# Patient Record
Sex: FEMALE | Race: WHITE | NOT HISPANIC OR LATINO | Employment: OTHER | RURAL
[De-identification: names, ages, dates, MRNs, and addresses within clinical notes are randomized per-mention and may not be internally consistent; named-entity substitution may affect disease eponyms.]

---

## 2021-04-07 ENCOUNTER — HISTORICAL (OUTPATIENT)
Dept: ADMINISTRATIVE | Facility: HOSPITAL | Age: 66
End: 2021-04-07

## 2022-04-29 DIAGNOSIS — M17.11 OSTEOARTHRITIS OF RIGHT KNEE: ICD-10-CM

## 2022-04-29 DIAGNOSIS — S83.207A UNSPECIFIED TEAR OF UNSPECIFIED MENISCUS, CURRENT INJURY, LEFT KNEE, INITIAL ENCOUNTER: Primary | ICD-10-CM

## 2022-05-11 ENCOUNTER — CLINICAL SUPPORT (OUTPATIENT)
Dept: REHABILITATION | Facility: HOSPITAL | Age: 67
End: 2022-05-11
Payer: COMMERCIAL

## 2022-05-11 DIAGNOSIS — M25.661 DECREASED RANGE OF MOTION OF BOTH KNEES: ICD-10-CM

## 2022-05-11 DIAGNOSIS — M17.11 OSTEOARTHRITIS OF RIGHT KNEE: ICD-10-CM

## 2022-05-11 DIAGNOSIS — M25.562 LEFT ANTERIOR KNEE PAIN: ICD-10-CM

## 2022-05-11 DIAGNOSIS — M17.11 PRIMARY OSTEOARTHRITIS OF RIGHT KNEE: ICD-10-CM

## 2022-05-11 DIAGNOSIS — M25.662 DECREASED RANGE OF MOTION OF BOTH KNEES: ICD-10-CM

## 2022-05-11 DIAGNOSIS — S83.207A UNSPECIFIED TEAR OF UNSPECIFIED MENISCUS, CURRENT INJURY, LEFT KNEE, INITIAL ENCOUNTER: ICD-10-CM

## 2022-05-11 PROCEDURE — 97162 PT EVAL MOD COMPLEX 30 MIN: CPT

## 2022-05-11 NOTE — PROGRESS NOTES
"  Physical Therapy Initial Evaluation    Name: Cecilia Botello  Clinic Number: [id number]    Therapy Diagnosis:   Encounter Diagnoses   Name Primary?    Osteoarthritis of right knee     Unspecified tear of unspecified meniscus, current injury, left knee, initial encounter     Left anterior knee pain     Decreased range of motion of both knees      Physician: Wilfred Alas MD    Physician Orders: PT Eval and Treat   Medical Diagnosis from Referral: Osteoarthritis of right knee [M17.11], Unspecified tear of unspecified meniscus, current injury, left knee, initial encounter [S83.207A]    Evaluation Date: 5/11/2022  Authorization Period Expiration: 4/29/2023  Plan of Care Expiration: 08/03/2022  Visit # / Visits authorized: 1/ 1    Time In: 10:15 AM  Time Out: 10:57 AM  Total Billable Time: 42 minutes    Precautions: Standard    Subjective   Date of onset: 5 months ago   History of current condition - Kaity reports: Patient reports that she had knee pain about 5 knee ago; she states she got the xray 2 month ago which showed "bone on bone". Patient said MD says she may have a possible meniscus tear. Patient states she has not had a fall; she had a storke about a year ago. Patient states the pain feels radiates from her knee to her feet and has difficulty sleeping. Patient has difficulty walking.      Medical History:   No past medical history on file.    Surgical History:   Cecilia Botello  has no past surgical history on file.    Medications:   Cecilia currently has no medications in their medication list.    Allergies:   Review of patient's allergies indicates:  Not on File     Imaging, bone scan films: X-ray 2 months ago    Prior Therapy: None  Social History:  lives alone  Occupation: Disabled  Prior Level of Function: Independent  Current Level of Function: 50% of prior level of function    Pain:  Current 8/10, worst 8/10, best 5/10   Location: left knee   Description: Deep and Shooting  Aggravating Factors: " Walking  Easing Factors: rest    Pts goals: Decreased pain to perform more walking at home    Objective       Sensation:  intact to light touch    ROM   Right (degrees) Left (degrees)   Knee Flexion 125 110   Knee Extension -2 -5         Strength   Right    Left   Gluteus Medius 3+/5 3+/5   Psoas 4-/5 4/5   Quadriceps 3+/5 3+/5   Hamstrings 3+/5 3-/5   Anterior Tibialis 4/5 4/5   Gastroc/Soleus 4+/5 4+/5       Special Tests:   Valgus Stress Test  -  Varus Stress Test   -  Stable Lachman  -  Anterior Drawer  -  Posterior Drawer  -  Reverse Lachman  +/-  Apley's Distraction  +/-  Eber's  + on left (clicking)          Palpation: Soft tissue restriction       Gait Analysis: Ataxic gait with LLE           CMS Impairment/Limitation/Restriction for FOTO Knee Survey    Therapist reviewed FOTO scores for Cecilia Botello on 5/11/2022.   FOTO documents entered into [employer] - see Media section.    Limitation Score: 64%             TREATMENT     Total Treatment time separate from Evaluation:       Cecilia Botello received the following manual therapy techniques applied for  minutes, including:    Manual Intervention Performed Today    Soft Tissue Mobilization      Joint Mobilizations     Mobilization with movement          Functional Dry Needling        Plan for Next Visit:          Cecilia Botello received therapeutic exercises to develop strength, endurance, ROM, flexibility, posture and core stabilization for 8 minutes including:    Intervention Performed Today    Quad Set x 1 x 10   Heel Slide x 1 x 10   SAQ x 1 x 10                              Plan for Next Visit:          Cecilia Botello participated in neuromuscular re-education activities to improve: Balance, Coordination, Kinesthetic, Sense, Proprioception and Posture for   minutes., including:    Intervention Performed Today                                              Plan for Next Visit:        Home Exercises and Patient Education Provided   Patient educated on the  impairments noted above and the effects of physical therapy intervention to improve overall condition and QOL.    Patient was educated on all the above exercise prior/during/after for proper posture, positioning, and execution for safe performance with home exercise program.    Patient educated on postural awareness and the use of a lumbar roll when in a seated position to reduce stress and maintain optimal alignment of the spine.    Patient educated on the importance of strong core and lower extremity musculature in order to improve both static and dynamic balance, improve gait mechanics, reduce fall risk and improve household and community mobility.       Written Home Exercises Provided: yes.  Exercises were reviewed and Kaity was able to demonstrate them prior to the end of the session.  Kaity demonstrated good  understanding of the education provided.     See EMR under Patient Instructions for exercises provided 5/11/2022.    Assessment   Cecilia is a 66 y.o. female referred to outpatient Physical Therapy with a medical diagnosis of Osteoarthritis of right knee [M17.11], Unspecified tear of unspecified meniscus, current injury, left knee, initial encounter [S83.164A] .Pt presents with impairments including: decreased ROM, decreased strength, decreased joint mobility, decreased muscle length, impaired coordination, impaired balance, gait abnormalities and decreased overall function.Pt prognosis is Good.  Pt will benefit from skilled outpatient Physical Therapy to address the deficits stated above and in the chart below, provide pt/family education, and to maximize pt's level of independence. Pt prognosis is Good.  Pt will benefit from skilled outpatient Physical Therapy to address the deficits stated above and in the chart below, provide pt/family education, and to maximize pt's level of independence.     Plan of care discussed with patient: Yes  Pt's spiritual, cultural and educational needs considered and  patient is agreeable to the plan of care and goals as stated below:     Anticipated Barriers for therapy: co-morbidities, sedentary lifestyle and occupation      GOALS:    Short Term Goals:  6 weeks Progress      1. Pain: Pt will demonstrate improved pain by reports of less than or equal to 5/10 worst pain on the verbal rating scale in order to progress toward maximal functional ability and improve QOL. PC   2. Function: Patient will demonstrate improved function as indicated by a functional limitation score of less than or equal to 67 out of 100 on FOTO. PC   3. Mobility: Patient will improve AROM to 50% of stated goals, listed in objective measures above, in order to progress towards independence with functional activities.  PC   4. Strength: Patient will improve strength to 50% of stated goals, listed in objective measures above, in order to progress towards independence with functional activities.  PC   5. HEP: Patient will demonstrate independence with HEP in order to progress toward functional independence. PC     Long Term Goals:  12 weeks Progress     1. Pain: Pt will demonstrate improved pain by reports of less than or equal to 2/10 worst pain on the verbal rating scale in order to progress toward maximal functional ability and improve QOL.   PC   2. Function: Patient will demonstrate improved function as indicated by a functional limitation score of less than or equal to 70 out of 100 on FOTO. PC   3. Mobility: Patient will improve AROM to stated goals, listed in objective measures above, in order to return to maximal functional potential and improve quality of life. PC   4. Strength: Patient will improve strength to stated goals, listed in objective measures above, in order to improve functional independence and quality of life. PC   5. Patient will return to normal ADL's, IADL's, community involvement, recreational activities, and work-related activities with less than or equal to 2/10 pain and maximal  function.  PC    PC= progressing/continue; PM= partially met;        DC= discontinue      Plan   Plan of care Certification: 5/11/2022 to 8/3/2022.    Outpatient Physical Therapy 2 times weekly for 12 weeks to include the following interventions: Electrical Stimulation quadriceps, Gait Training, Manual Therapy, Moist Heat/ Ice, Neuromuscular Re-ed, Patient Education, Therapeutic Activities and Therapeutic Exercise. manual therapy, therapeutic exercise, neuromuscular re-education, functional activities, modalities, and patient education.    Co-evaluation performed today with Kerry San PT, DPT   MCKENNA TRAORE, PT, ATP  Kerry San, PT, DPT

## 2022-05-11 NOTE — PLAN OF CARE
"Physical Therapy Initial Evaluation    Name: Cecilia Botello  Clinic Number: [id number]    Therapy Diagnosis:   Encounter Diagnoses   Name Primary?    Osteoarthritis of right knee     Unspecified tear of unspecified meniscus, current injury, left knee, initial encounter     Left anterior knee pain     Decreased range of motion of both knees      Physician: Wilfred Alas MD    Physician Orders: PT Eval and Treat   Medical Diagnosis from Referral: Osteoarthritis of right knee [M17.11], Unspecified tear of unspecified meniscus, current injury, left knee, initial encounter [S83.207A]    Evaluation Date: 5/11/2022  Authorization Period Expiration: 4/29/2023  Plan of Care Expiration: 08/03/2022  Visit # / Visits authorized: 1/ 1    Time In: 10:15 AM  Time Out: 10:57 AM  Total Billable Time: 42 minutes    Precautions: Standard    Subjective   Date of onset: 5 months ago   History of current condition - Kaity reports: Patient reports that she had knee pain about 5 knee ago; she states she got the xray 2 month ago which showed "bone on bone". Patient said MD says she may have a possible meniscus tear. Patient states she has not had a fall; she had a storke about a year ago. Patient states the pain feels radiates from her knee to her feet and has difficulty sleeping. Patient has difficulty walking.      Medical History:   No past medical history on file.    Surgical History:   Cecilia Botello  has no past surgical history on file.    Medications:   Cecilia currently has no medications in their medication list.    Allergies:   Review of patient's allergies indicates:  Not on File     Imaging, bone scan films: X-ray 2 months ago    Prior Therapy: None  Social History:  lives alone  Occupation: Disabled  Prior Level of Function: Independent  Current Level of Function: 50% of prior level of function    Pain:  Current 8/10, worst 8/10, best 5/10   Location: left knee   Description: Deep and Shooting  Aggravating Factors: " Walking  Easing Factors: rest    Pts goals: Decreased pain to perform more walking at home    Objective       Sensation:  intact to light touch    ROM   Right (degrees) Left (degrees)   Knee Flexion 125 110   Knee Extension -2 -5         Strength   Right    Left   Gluteus Medius 3+/5 3+/5   Psoas 4-/5 4/5   Quadriceps 3+/5 3+/5   Hamstrings 3+/5 3-/5   Anterior Tibialis 4/5 4/5   Gastroc/Soleus 4+/5 4+/5       Special Tests:   Valgus Stress Test  -  Varus Stress Test   -  Stable Lachman  -  Anterior Drawer  -  Posterior Drawer  -  Reverse Lachman  +/-  Apley's Distraction  +/-  Eber's  + on left (clicking)          Palpation: Soft tissue restriction       Gait Analysis: Ataxic gait with LLE           CMS Impairment/Limitation/Restriction for FOTO Knee Survey    Therapist reviewed FOTO scores for Cecilia Botello on 5/11/2022.   FOTO documents entered into [employer] - see Media section.    Limitation Score: 64%             TREATMENT     Total Treatment time separate from Evaluation:       Cecilia Botello received the following manual therapy techniques applied for  minutes, including:    Manual Intervention Performed Today    Soft Tissue Mobilization      Joint Mobilizations     Mobilization with movement          Functional Dry Needling        Plan for Next Visit:          Cecilia Botello received therapeutic exercises to develop strength, endurance, ROM, flexibility, posture and core stabilization for 8 minutes including:    Intervention Performed Today    Quad Set x 1 x 10   Heel Slide x 1 x 10   SAQ x 1 x 10                              Plan for Next Visit:          Cecilia Botello participated in neuromuscular re-education activities to improve: Balance, Coordination, Kinesthetic, Sense, Proprioception and Posture for   minutes., including:    Intervention Performed Today                                              Plan for Next Visit:        Home Exercises and Patient Education Provided   Patient educated on the  impairments noted above and the effects of physical therapy intervention to improve overall condition and QOL.    Patient was educated on all the above exercise prior/during/after for proper posture, positioning, and execution for safe performance with home exercise program.    Patient educated on postural awareness and the use of a lumbar roll when in a seated position to reduce stress and maintain optimal alignment of the spine.    Patient educated on the importance of strong core and lower extremity musculature in order to improve both static and dynamic balance, improve gait mechanics, reduce fall risk and improve household and community mobility.       Written Home Exercises Provided: yes.  Exercises were reviewed and Kaity was able to demonstrate them prior to the end of the session.  Kaity demonstrated good  understanding of the education provided.     See EMR under Patient Instructions for exercises provided 5/11/2022.    Assessment   Cecilia is a 66 y.o. female referred to outpatient Physical Therapy with a medical diagnosis of Osteoarthritis of right knee [M17.11], Unspecified tear of unspecified meniscus, current injury, left knee, initial encounter [S83.803A] .Pt presents with impairments including: decreased ROM, decreased strength, decreased joint mobility, decreased muscle length, impaired coordination, impaired balance, gait abnormalities and decreased overall function.Pt prognosis is Good.  Pt will benefit from skilled outpatient Physical Therapy to address the deficits stated above and in the chart below, provide pt/family education, and to maximize pt's level of independence. Pt prognosis is Good.  Pt will benefit from skilled outpatient Physical Therapy to address the deficits stated above and in the chart below, provide pt/family education, and to maximize pt's level of independence.     Plan of care discussed with patient: Yes  Pt's spiritual, cultural and educational needs considered and  patient is agreeable to the plan of care and goals as stated below:     Anticipated Barriers for therapy: co-morbidities, sedentary lifestyle and occupation      GOALS:    Short Term Goals:  6 weeks Progress      1. Pain: Pt will demonstrate improved pain by reports of less than or equal to 5/10 worst pain on the verbal rating scale in order to progress toward maximal functional ability and improve QOL. PC   2. Function: Patient will demonstrate improved function as indicated by a functional limitation score of less than or equal to 67 out of 100 on FOTO. PC   3. Mobility: Patient will improve AROM to 50% of stated goals, listed in objective measures above, in order to progress towards independence with functional activities.  PC   4. Strength: Patient will improve strength to 50% of stated goals, listed in objective measures above, in order to progress towards independence with functional activities.  PC   5. HEP: Patient will demonstrate independence with HEP in order to progress toward functional independence. PC     Long Term Goals:  12 weeks Progress     1. Pain: Pt will demonstrate improved pain by reports of less than or equal to 2/10 worst pain on the verbal rating scale in order to progress toward maximal functional ability and improve QOL.   PC   2. Function: Patient will demonstrate improved function as indicated by a functional limitation score of less than or equal to 70 out of 100 on FOTO. PC   3. Mobility: Patient will improve AROM to stated goals, listed in objective measures above, in order to return to maximal functional potential and improve quality of life. PC   4. Strength: Patient will improve strength to stated goals, listed in objective measures above, in order to improve functional independence and quality of life. PC   5. Patient will return to normal ADL's, IADL's, community involvement, recreational activities, and work-related activities with less than or equal to 2/10 pain and maximal  function.  PC    PC= progressing/continue; PM= partially met;        DC= discontinue      Plan   Plan of care Certification: 5/11/2022 to 8/3/2022.    Outpatient Physical Therapy 2 times weekly for 12 weeks to include the following interventions: Electrical Stimulation quadriceps, Gait Training, Manual Therapy, Moist Heat/ Ice, Neuromuscular Re-ed, Patient Education, Therapeutic Activities and Therapeutic Exercise. manual therapy, therapeutic exercise, neuromuscular re-education, functional activities, modalities, and patient education.    Co-evaluation performed today with Kerry San PT, DPT   MCKENNA TRAORE, PT, ATP  Kerry San, PT, DPT

## 2022-05-19 ENCOUNTER — CLINICAL SUPPORT (OUTPATIENT)
Dept: REHABILITATION | Facility: HOSPITAL | Age: 67
End: 2022-05-19
Payer: COMMERCIAL

## 2022-05-19 DIAGNOSIS — M25.562 LEFT ANTERIOR KNEE PAIN: Primary | ICD-10-CM

## 2022-05-19 DIAGNOSIS — M25.662 DECREASED RANGE OF MOTION OF BOTH KNEES: ICD-10-CM

## 2022-05-19 DIAGNOSIS — M25.661 DECREASED RANGE OF MOTION OF BOTH KNEES: ICD-10-CM

## 2022-05-19 PROCEDURE — 97110 THERAPEUTIC EXERCISES: CPT

## 2022-05-19 NOTE — PROGRESS NOTES
[facility]  Physical Therapy Treatment Note       Name: Cecilia Botello  Clinic Number: [id number]    Therapy Diagnosis:   Encounter Diagnoses   Name Primary?    Left anterior knee pain Yes    Decreased range of motion of both knees      Physician: Wilfred Alas MD    Visit Date: 5/19/2022    Physician Orders: PT Eval and Treat   Medical Diagnosis from Referral: Osteoarthritis of right knee [M17.11], Unspecified tear of unspecified meniscus, current injury, left knee, initial encounter [S83.207A]     Evaluation Date: 5/11/2022  Progress note due: 6/8/2022  Authorization Period Expiration: 4/29/2023  Plan of Care Expiration: 08/03/2022  Visit # / Visits authorized: 1/ 10 (+1 eval)        Precautions: Standard      Time In: 9:25 AM  Time Out: 9: 55 AM  Total Billable Time: 30 minutes    SUBJECTIVE     Pt reports:  Her Knee is still hurting today  Compliance with Hep: Daily  Response to previous treatment: no adverse reactions to treatment/updated HEP  Functional change: No Change    Pain: 6/10   Worst: 8/10  Location: Left anterior Knee        OBJECTIVE     Objective Measures updated at progress report unless specified otherwise.    Treatment             Kaity received therapeutic exercises to develop strength, endurance, ROM, flexibility, and posture for (30) minutes including: x = exercises performed   Cecilia Botello received the following manual therapy techniques applied for  minutes, including:     Manual Intervention Performed Today     Soft Tissue Mobilization       Joint Mobilizations       Mobilization with movement               Functional Dry Needling           Plan for Next Visit:             Cecilia Botello received therapeutic exercises to develop strength, endurance, ROM, flexibility, posture and core stabilization for 8 minutes including:     Intervention Performed Today     Quad Set x 3 x 10 BLE   Heel Slide x 1 x 10 LE   SAQ x 3 x 10 LE   Bridges  x  2 x 10 LE   SLR x   2  x 10 LE   Clamshell  x  3 x 10 LE                      Plan for Next Visit:             Cecilia Botello participated in neuromuscular re-education activities to improve: Balance, Coordination, Kinesthetic, Sense, Proprioception and Posture for   minutes., including:     Intervention Performed Today                                                                        Plan for Next Visit:              Home Exercises and Patient Education Provided    Education/Self-Care provided: (included in treatment) minutes    Patient educated on the impairments noted above and the effects of physical therapy intervention to improve overall condition and QOL.    Patient was educated on all the above exercise prior/during/after for proper posture, positioning, and execution for safe performance with home exercise program.    Written Home Exercises Provided: No.   Exercises were reviewed and Kaity was able to demonstrate them prior to the end of the session.  Kaity demonstrated good understanding of the education provided.     See EMR under Patient Instructions for exercises provided during therapy sessions.    ASSESSMENT     Kaity Botello tolerated PT session well with minimal  complaints of pain or discomfort, secondary to  impairments including: decreased ROM, decreased strength, decreased joint mobility, decreased muscle length, impaired coordination, impaired balance, gait abnormalities and decreased overall function. Objective findings show no change with measurements functional mobility.  Therapy exercises were reviewed by revisiting exercises given from previous home exercise program.  Handouts were issued during today's visit. Cecilia demonstrated good understanding of new exercises and will continue to progress at home until next follow-up.       Kaity Is progressing well towards her goals.   Pt prognosis is Good.     Pt will continue to benefit from skilled outpatient physical therapy to address the deficits listed in  the problem list box on initial evaluation, provide pt/family education and to maximize pt's level of independence in the home and community environment.     Pt's spiritual, cultural and educational needs considered and pt agreeable to plan of care and goals.    Anticipated barriers to physical therapy:     GOALS:     Short Term Goals:  6 weeks Progress       1. Pain: Pt will demonstrate improved pain by reports of less than or equal to 5/10 worst pain on the verbal rating scale in order to progress toward maximal functional ability and improve QOL. PC   2. Function: Patient will demonstrate improved function as indicated by a functional limitation score of less than or equal to 67 out of 100 on FOTO. PC   3. Mobility: Patient will improve AROM to 50% of stated goals, listed in objective measures above, in order to progress towards independence with functional activities.  PC   4. Strength: Patient will improve strength to 50% of stated goals, listed in objective measures above, in order to progress towards independence with functional activities.  PC   5. HEP: Patient will demonstrate independence with HEP in order to progress toward functional independence. PC      Long Term Goals:  12 weeks Progress      1. Pain: Pt will demonstrate improved pain by reports of less than or equal to 2/10 worst pain on the verbal rating scale in order to progress toward maximal functional ability and improve QOL.   PC   2. Function: Patient will demonstrate improved function as indicated by a functional limitation score of less than or equal to 70 out of 100 on FOTO. PC   3. Mobility: Patient will improve AROM to stated goals, listed in objective measures above, in order to return to maximal functional potential and improve quality of life. PC   4. Strength: Patient will improve strength to stated goals, listed in objective measures above, in order to improve functional independence and quality of life. PC   5. Patient will return  to normal ADL's, IADL's, community involvement, recreational activities, and work-related activities with less than or equal to 2/10 pain and maximal function.  PC    PC= progressing/continue;    PM= partially met;             DC= discontinue         PC = progressing/continue  PM= partially met  DC= discontinue    PLAN     Continue Plan of Care (POC) and progress per patient tolerance. See Treatment section for exercise progression.    Kerry San, PT, DPT

## 2022-05-20 ENCOUNTER — CLINICAL SUPPORT (OUTPATIENT)
Dept: REHABILITATION | Facility: HOSPITAL | Age: 67
End: 2022-05-20
Payer: COMMERCIAL

## 2022-05-20 DIAGNOSIS — M25.662 DECREASED RANGE OF MOTION OF BOTH KNEES: ICD-10-CM

## 2022-05-20 DIAGNOSIS — M25.661 DECREASED RANGE OF MOTION OF BOTH KNEES: ICD-10-CM

## 2022-05-20 DIAGNOSIS — M25.562 LEFT ANTERIOR KNEE PAIN: Primary | ICD-10-CM

## 2022-05-20 PROCEDURE — 97110 THERAPEUTIC EXERCISES: CPT

## 2022-05-20 NOTE — PROGRESS NOTES
[facility]  Physical Therapy Treatment Note       Name: Cecilia Botello  Clinic Number: [id number]    Therapy Diagnosis:   Encounter Diagnoses   Name Primary?    Left anterior knee pain Yes    Decreased range of motion of both knees      Physician: Wilfred Alas MD    Visit Date: 5/20/2022    Physician Orders: PT Eval and Treat   Medical Diagnosis from Referral: Osteoarthritis of right knee [M17.11], Unspecified tear of unspecified meniscus, current injury, left knee, initial encounter [S83.207A]     Evaluation Date: 5/11/2022  Progress note due: 6/8/2022  Authorization Period Expiration: 4/29/2023  Plan of Care Expiration: 08/03/2022  Visit # / Visits authorized: 2/ 10 (+1 eval)        Precautions: Standard      Time In: 8:25 AM  Time Out: 9: 05 AM  Total Billable Time: 30 minutes    SUBJECTIVE     Pt reports:  Her Knee is doing better but she is sore from yesterday; she has no pain because she has taken a pain pill this morning.    Compliance with Hep: Daily  Response to previous treatment: no adverse reactions to treatment/updated HEP  Functional change: No Change    Pain: 0/10   Worst: 8/10  Location: Left anterior Knee        OBJECTIVE     Objective Measures updated at progress report unless specified otherwise.    Treatment             Kaity received therapeutic exercises to develop strength, endurance, ROM, flexibility, and posture for (30) minutes including: x = exercises performed     Cecilia Botello received the following manual therapy techniques applied for  minutes, including:     Manual Intervention Performed Today     Soft Tissue Mobilization       Joint Mobilizations       Mobilization with movement               Functional Dry Needling           Plan for Next Visit:             Cecilia Botello received therapeutic exercises to develop strength, endurance, ROM, flexibility, posture and core stabilization for 30 minutes including:     Intervention Performed Today     Quad  Set x 3 x 10 BLE   Heel Slide x 1 x 10 LE   SAQ x 3 x 10 LE   Bridges    2 x 10 LE   SLR   2 x 10 LE   Clamshell    3 x 10 LE    Nu Step  x  6 min    Step up  x 3 x 10 LLE                        TKE with red Theraband  x  3 x 10      Plan for Next Visit:             Cecilia Botello participated in neuromuscular re-education activities to improve: Balance, Coordination, Kinesthetic, Sense, Proprioception and Posture for   minutes., including:     Intervention Performed Today                                                                        Plan for Next Visit:              Home Exercises and Patient Education Provided    Education/Self-Care provided: (included in treatment) minutes    Patient educated on the impairments noted above and the effects of physical therapy intervention to improve overall condition and QOL.    Patient was educated on all the above exercise prior/during/after for proper posture, positioning, and execution for safe performance with home exercise program.    Written Home Exercises Provided: No.   Exercises were reviewed and Kaity was able to demonstrate them prior to the end of the session.  Kaity demonstrated good understanding of the education provided.     See EMR under Patient Instructions for exercises provided during therapy sessions.    ASSESSMENT     Kaity Botello tolerated PT session well with no  complaints of pain or discomfort, secondary to  impairments including: decreased ROM, decreased strength, decreased joint mobility, decreased muscle length, impaired coordination, impaired balance, gait abnormalities and decreased overall function. Objective findings show no change with measurements functional mobility.  Therapy exercises were reviewed by revisiting exercises given from previous home exercise program.  Handouts were issued during today's visit. Cecilia demonstrated good understanding of new exercises and will continue to progress at home until next follow-up.        Kaity Is progressing well towards her goals.   Pt prognosis is Good.     Pt will continue to benefit from skilled outpatient physical therapy to address the deficits listed in the problem list box on initial evaluation, provide pt/family education and to maximize pt's level of independence in the home and community environment.     Pt's spiritual, cultural and educational needs considered and pt agreeable to plan of care and goals.    Anticipated barriers to physical therapy:     GOALS:     Short Term Goals:  6 weeks Progress       1. Pain: Pt will demonstrate improved pain by reports of less than or equal to 5/10 worst pain on the verbal rating scale in order to progress toward maximal functional ability and improve QOL. PC   2. Function: Patient will demonstrate improved function as indicated by a functional limitation score of less than or equal to 67 out of 100 on FOTO. PC   3. Mobility: Patient will improve AROM to 50% of stated goals, listed in objective measures above, in order to progress towards independence with functional activities.  PC   4. Strength: Patient will improve strength to 50% of stated goals, listed in objective measures above, in order to progress towards independence with functional activities.  PC   5. HEP: Patient will demonstrate independence with HEP in order to progress toward functional independence. PC      Long Term Goals:  12 weeks Progress      1. Pain: Pt will demonstrate improved pain by reports of less than or equal to 2/10 worst pain on the verbal rating scale in order to progress toward maximal functional ability and improve QOL.   PC   2. Function: Patient will demonstrate improved function as indicated by a functional limitation score of less than or equal to 70 out of 100 on FOTO. PC   3. Mobility: Patient will improve AROM to stated goals, listed in objective measures above, in order to return to maximal functional potential and improve quality of life. PC    4. Strength: Patient will improve strength to stated goals, listed in objective measures above, in order to improve functional independence and quality of life. PC   5. Patient will return to normal ADL's, IADL's, community involvement, recreational activities, and work-related activities with less than or equal to 2/10 pain and maximal function.  PC    PC= progressing/continue;    PM= partially met;             DC= discontinue         PC = progressing/continue  PM= partially met  DC= discontinue    PLAN     Continue Plan of Care (POC) and progress per patient tolerance. See Treatment section for exercise progression.    Kerry San, PT, DPT

## 2022-05-24 ENCOUNTER — CLINICAL SUPPORT (OUTPATIENT)
Dept: REHABILITATION | Facility: HOSPITAL | Age: 67
End: 2022-05-24
Payer: COMMERCIAL

## 2022-05-24 DIAGNOSIS — M25.661 DECREASED RANGE OF MOTION OF BOTH KNEES: ICD-10-CM

## 2022-05-24 DIAGNOSIS — M25.562 LEFT ANTERIOR KNEE PAIN: Primary | ICD-10-CM

## 2022-05-24 DIAGNOSIS — M25.662 DECREASED RANGE OF MOTION OF BOTH KNEES: ICD-10-CM

## 2022-05-24 PROCEDURE — 97110 THERAPEUTIC EXERCISES: CPT

## 2022-05-24 NOTE — PROGRESS NOTES
[facility]  Physical Therapy Treatment Note       Name: Cecilia Botello  Clinic Number: [id number]    Therapy Diagnosis:   Encounter Diagnoses   Name Primary?    Left anterior knee pain Yes    Decreased range of motion of both knees      Physician: Wilfred Alas MD    Visit Date: 5/24/2022    Physician Orders: PT Eval and Treat   Medical Diagnosis from Referral: Osteoarthritis of right knee [M17.11], Unspecified tear of unspecified meniscus, current injury, left knee, initial encounter [S83.207A]     Evaluation Date: 5/11/2022  Progress note due: 6/8/2022  Authorization Period Expiration: 4/29/2023  Plan of Care Expiration: 08/03/2022  Visit # / Visits authorized: 3/ 10 (+1 eval)        Precautions: Standard      Time In: 9:17  AM  Time Out: 9: 51  AM  Total Billable Time: 34 minutes    SUBJECTIVE     Pt reports:  Pain is not bad its about about a 5 today, patient reports that HEP is helping she does some and stops and does it again    Compliance with Hep: Daily     Response to previous treatment: no adverse reactions to treatment/updated HEP    Functional change: No Change    Pain: 5/10   Worst: 8/10  Location: Left anterior Knee        OBJECTIVE     Objective Measures updated at progress report unless specified otherwise.    Treatment       Kaity received therapeutic exercises to develop strength, endurance, ROM, flexibility, and posture for (30) minutes including: x = exercises performed     Cecilia Botello received the following manual therapy techniques applied for  minutes, including:     Manual Intervention Performed Today     Soft Tissue Mobilization       Joint Mobilizations       Mobilization with movement               Functional Dry Needling           Plan for Next Visit:             Cecilia Botello received therapeutic exercises to develop strength, endurance, ROM, flexibility, posture and core stabilization for 30 minutes including:     Intervention Performed Today     Quad  Set x 3 x 10 BLE   Heel Slide  1 x 10 LE   SAQ x 3 x 10 LE  2LB   Bridges  x  3 x 10 LE   SLR   3 x 10 LE   Clamshell    3 x 10 LE    Nu Step  x  7 min    Step up  x 3 x 10 LLE   Self moblilzation x  3 min    TKE with ball x 3 x 10              TKE with red Theraband  x  3 x 10      Plan for Next Visit:             Cecilia Botello participated in neuromuscular re-education activities to improve: Balance, Coordination, Kinesthetic, Sense, Proprioception and Posture for   minutes., including:     Intervention Performed Today                                                                        Plan for Next Visit:              Home Exercises and Patient Education Provided    Education/Self-Care provided: (included in treatment) minutes    Patient educated on the impairments noted above and the effects of physical therapy intervention to improve overall condition and QOL.    Patient was educated on all the above exercise prior/during/after for proper posture, positioning, and execution for safe performance with home exercise program.    Written Home Exercises Provided: No.   Exercises were reviewed and Kaity was able to demonstrate them prior to the end of the session.  Kaity demonstrated good understanding of the education provided.     See EMR under Patient Instructions for exercises provided during therapy sessions.    ASSESSMENT     Kaity Botello tolerated PT session well with no  complaints of pain or discomfort, secondary to  impairments including: decreased ROM, decreased strength, decreased joint mobility, decreased muscle length, impaired coordination, impaired balance, gait abnormalities and decreased overall function. Objective findings show no change with measurements functional mobility.  Therapy exercises were reviewed by revisiting exercises given from previous home exercise program.  Handouts were issued during today's visit. Cecilia demonstrated good understanding of new exercises and will continue  to progress at home until next follow-up.       Kaity Is progressing well towards her goals.   Pt prognosis is Good.     Pt will continue to benefit from skilled outpatient physical therapy to address the deficits listed in the problem list box on initial evaluation, provide pt/family education and to maximize pt's level of independence in the home and community environment.     Pt's spiritual, cultural and educational needs considered and pt agreeable to plan of care and goals.    Anticipated barriers to physical therapy:     GOALS:     Short Term Goals:  6 weeks Progress       1. Pain: Pt will demonstrate improved pain by reports of less than or equal to 5/10 worst pain on the verbal rating scale in order to progress toward maximal functional ability and improve QOL. PC   2. Function: Patient will demonstrate improved function as indicated by a functional limitation score of less than or equal to 67 out of 100 on FOTO. PC   3. Mobility: Patient will improve AROM to 50% of stated goals, listed in objective measures above, in order to progress towards independence with functional activities.  PC   4. Strength: Patient will improve strength to 50% of stated goals, listed in objective measures above, in order to progress towards independence with functional activities.  PC   5. HEP: Patient will demonstrate independence with HEP in order to progress toward functional independence. PC      Long Term Goals:  12 weeks Progress      1. Pain: Pt will demonstrate improved pain by reports of less than or equal to 2/10 worst pain on the verbal rating scale in order to progress toward maximal functional ability and improve QOL.   PC   2. Function: Patient will demonstrate improved function as indicated by a functional limitation score of less than or equal to 70 out of 100 on FOTO. PC   3. Mobility: Patient will improve AROM to stated goals, listed in objective measures above, in order to return to maximal functional  potential and improve quality of life. PC   4. Strength: Patient will improve strength to stated goals, listed in objective measures above, in order to improve functional independence and quality of life. PC   5. Patient will return to normal ADL's, IADL's, community involvement, recreational activities, and work-related activities with less than or equal to 2/10 pain and maximal function.  PC    PC= progressing/continue;    PM= partially met;             DC= discontinue         PC = progressing/continue  PM= partially met  DC= discontinue    PLAN     Continue Plan of Care (POC) and progress per patient tolerance. See Treatment section for exercise progression.    Kerry San, PT, DPT

## 2022-05-26 ENCOUNTER — CLINICAL SUPPORT (OUTPATIENT)
Dept: REHABILITATION | Facility: HOSPITAL | Age: 67
End: 2022-05-26
Payer: COMMERCIAL

## 2022-05-26 DIAGNOSIS — M25.661 DECREASED RANGE OF MOTION OF BOTH KNEES: ICD-10-CM

## 2022-05-26 DIAGNOSIS — M25.662 DECREASED RANGE OF MOTION OF BOTH KNEES: ICD-10-CM

## 2022-05-26 DIAGNOSIS — M25.562 LEFT ANTERIOR KNEE PAIN: Primary | ICD-10-CM

## 2022-05-26 PROCEDURE — 97110 THERAPEUTIC EXERCISES: CPT

## 2022-05-26 NOTE — PROGRESS NOTES
[facility]  Physical Therapy Treatment Note       Name: Cecilia Botello  Clinic Number: [id number]    Therapy Diagnosis:   Encounter Diagnoses   Name Primary?    Left anterior knee pain Yes    Decreased range of motion of both knees      Physician: Wilfred Alas MD    Visit Date: 5/26/2022    Physician Orders: PT Eval and Treat   Medical Diagnosis from Referral: Osteoarthritis of right knee [M17.11], Unspecified tear of unspecified meniscus, current injury, left knee, initial encounter [S83.207A]     Evaluation Date: 5/11/2022  Progress note due: 6/8/2022  Authorization Period Expiration: 4/29/2023  Plan of Care Expiration: 08/03/2022  Visit # / Visits authorized: 4/ 10 (+1 eval)        Precautions: Standard      Time In: 9:23  AM  Time Out: 9:57  AM  Total Billable Time: 34 minutes    SUBJECTIVE     Pt reports:  Patient reports her pain is a 5/10 and she is feeling somewhat better not changes functional yet.     Compliance with Hep: Daily     Response to previous treatment: no adverse reactions to treatment/updated HEP    Functional change: No Change    Pain: 5/10   Worst: 8/10  Location: Left anterior Knee        OBJECTIVE     Objective Measures updated at progress report unless specified otherwise.    Treatment       Kaity received therapeutic exercises to develop strength, endurance, ROM, flexibility, and posture for (34) minutes including: x = exercises performed     Cecilia Botello received the following manual therapy techniques applied for  minutes, including:     Manual Intervention Performed Today     Soft Tissue Mobilization       Joint Mobilizations       Mobilization with movement               Functional Dry Needling           Plan for Next Visit:             Cecilia Botello received therapeutic exercises to develop strength, endurance, ROM, flexibility, posture and core stabilization for 30 minutes including:     Intervention Performed Today     Quad Set x 3 x 10 BLE   Heel  Slide  1 x 10 LE   SAQ x 3 x 10 LE  2LB   Bridges  x  3 x 10 LE   SL SLR x 2 x 10 LE   SLR x  3 x 10 LE   Clamshell    3 x 10 LE    Nu Step  x  7 min    Step up   3 x 10 LLE   Self moblilzation   3 min    TKE with ball  3 x 10   Squats with Thera Ball x 3 x 10   Seated knee flexion with theraball x 3 x 10     TKE with red Theraband  x  3 x 10      Plan for Next Visit:             Cecilia Botello participated in neuromuscular re-education activities to improve: Balance, Coordination, Kinesthetic, Sense, Proprioception and Posture for   minutes., including:     Intervention Performed Today                                                                        Plan for Next Visit:              Home Exercises and Patient Education Provided    Education/Self-Care provided: (included in treatment) minutes    Patient educated on the impairments noted above and the effects of physical therapy intervention to improve overall condition and QOL.    Patient was educated on all the above exercise prior/during/after for proper posture, positioning, and execution for safe performance with home exercise program.    Written Home Exercises Provided: No.   Exercises were reviewed and Kaity was able to demonstrate them prior to the end of the session.  Kaity demonstrated good understanding of the education provided.     See EMR under Patient Instructions for exercises provided during therapy sessions.    ASSESSMENT     Kaity Botello tolerated PT session well with minimal  complaints of pain or discomfort, secondary to  impairments including: decreased ROM, decreased strength, decreased joint mobility, decreased muscle length, impaired coordination, impaired balance, gait abnormalities and decreased overall function. Objective findings show no change with measurements functional mobility. Patient did come in today with ataxic gait; improvement made with close chain exercises.  Therapy exercises were reviewed by revisiting exercises  given from previous home exercise program.  Handouts were issued during today's visit. Cecilia demonstrated good understanding of new exercises and will continue to progress at home until next follow-up.       Kaity Is progressing well towards her goals.   Pt prognosis is Good.     Pt will continue to benefit from skilled outpatient physical therapy to address the deficits listed in the problem list box on initial evaluation, provide pt/family education and to maximize pt's level of independence in the home and community environment.     Pt's spiritual, cultural and educational needs considered and pt agreeable to plan of care and goals.    Anticipated barriers to physical therapy:     GOALS:     Short Term Goals:  6 weeks Progress       1. Pain: Pt will demonstrate improved pain by reports of less than or equal to 5/10 worst pain on the verbal rating scale in order to progress toward maximal functional ability and improve QOL. PC   2. Function: Patient will demonstrate improved function as indicated by a functional limitation score of less than or equal to 67 out of 100 on FOTO. PC   3. Mobility: Patient will improve AROM to 50% of stated goals, listed in objective measures above, in order to progress towards independence with functional activities.  PC   4. Strength: Patient will improve strength to 50% of stated goals, listed in objective measures above, in order to progress towards independence with functional activities.  PC   5. HEP: Patient will demonstrate independence with HEP in order to progress toward functional independence. PC      Long Term Goals:  12 weeks Progress      1. Pain: Pt will demonstrate improved pain by reports of less than or equal to 2/10 worst pain on the verbal rating scale in order to progress toward maximal functional ability and improve QOL.   PC   2. Function: Patient will demonstrate improved function as indicated by a functional limitation score of less than or equal to 70 out of  100 on FOTO. PC   3. Mobility: Patient will improve AROM to stated goals, listed in objective measures above, in order to return to maximal functional potential and improve quality of life. PC   4. Strength: Patient will improve strength to stated goals, listed in objective measures above, in order to improve functional independence and quality of life. PC   5. Patient will return to normal ADL's, IADL's, community involvement, recreational activities, and work-related activities with less than or equal to 2/10 pain and maximal function.  PC    PC= progressing/continue;    PM= partially met;             DC= discontinue         PC = progressing/continue  PM= partially met  DC= discontinue    PLAN     Continue Plan of Care (POC) and progress per patient tolerance. See Treatment section for exercise progression.    Kerry San, PT, DPT

## 2022-06-06 ENCOUNTER — CLINICAL SUPPORT (OUTPATIENT)
Dept: REHABILITATION | Facility: HOSPITAL | Age: 67
End: 2022-06-06
Payer: COMMERCIAL

## 2022-06-06 DIAGNOSIS — M25.662 DECREASED RANGE OF MOTION OF BOTH KNEES: ICD-10-CM

## 2022-06-06 DIAGNOSIS — M25.661 DECREASED RANGE OF MOTION OF BOTH KNEES: ICD-10-CM

## 2022-06-06 DIAGNOSIS — M25.562 LEFT ANTERIOR KNEE PAIN: Primary | ICD-10-CM

## 2022-06-06 PROCEDURE — 97110 THERAPEUTIC EXERCISES: CPT | Mod: CQ

## 2022-06-06 PROCEDURE — 97010 HOT OR COLD PACKS THERAPY: CPT | Mod: CQ

## 2022-06-06 NOTE — PROGRESS NOTES
[facility]  Physical Therapy Treatment Note       Name: Cecilia Botello  Clinic Number: [id number]    Therapy Diagnosis:   Encounter Diagnoses   Name Primary?    Left anterior knee pain Yes    Decreased range of motion of both knees      Physician: Wilfred Alas MD    Visit Date: 6/6/2022    Physician Orders: PT Eval and Treat   Medical Diagnosis from Referral: Osteoarthritis of right knee [M17.11], Unspecified tear of unspecified meniscus, current injury, left knee, initial encounter [S83.207A]  Evaluation Date: 5/11/2022  Progress note due: 6/8/2022  Authorization Period Expiration: 4/29/2023  Plan of Care Expiration: 08/03/2022  Visit # / Visits authorized: 5/ 10 (+1 eval)  PTA Visit: 1      Precautions: Standard    Time In: 0921  Time Out: 0959  Total Billable Time: 38 minutes    SUBJECTIVE     Pt reports:  Patient reports her pain is a 7/10 this morning due to being on it a lot yesterday     Compliance with Hep: Daily     Response to previous treatment: no adverse reactions to treatment/updated HEP    Functional change: No Change    Pain: 7/10   Worst: 8/10  Location: Left anterior Knee      OBJECTIVE     Objective Measures updated at progress report unless specified otherwise.    Treatment       Kaity received therapeutic exercises to develop strength, endurance, ROM, flexibility, and posture for (28) minutes including: x = exercises performed     Cecilia Botello received the following manual therapy techniques applied for  minutes, including:     Manual Intervention Performed Today     Soft Tissue Mobilization       Joint Mobilizations       Mobilization with movement               Functional Dry Needling           Plan for Next Visit:             Cecilia Botello received therapeutic exercises to develop strength, endurance, ROM, flexibility, posture and core stabilization for 28 minutes including:     Intervention Performed Today     Nu Step x 7 min   Quad Set x 3 x 10 BLE   Heel  Slide  1 x 10 LE   SAQ x 3 x 10 LE  2LB (no weight today due to pain)   Bridges  x  3 x 10 LE   SL SLR x 2 x 10 LE   SLR x  3 x 10 LE   Clamshell    3 x 10 LE          Step up   3 x 10 LLE   Self moblilzation   3 min    TKE with ball  3 x 10   Squats with Thera Ball  3 x 10   Seated knee flexion with theraball x 3 x 10    TKE with red Theraband    3 x 10      Plan for Next Visit:             Cecilia Botello participated in neuromuscular re-education activities to improve: Balance, Coordination, Kinesthetic, Sense, Proprioception and Posture for   minutes., including:     Intervention Performed Today                                                                        Plan for Next Visit:          Kaity received an ice pack to left knee to decrease pain and edema for 10 minutes.    Home Exercises and Patient Education Provided    Education/Self-Care provided: (included in treatment) minutes    Patient educated on the impairments noted above and the effects of physical therapy intervention to improve overall condition and QOL.    Patient was educated on all the above exercise prior/during/after for proper posture, positioning, and execution for safe performance with home exercise program.    Written Home Exercises Provided: No.   Exercises were reviewed and Kaity was able to demonstrate them prior to the end of the session.  Kaity demonstrated good understanding of the education provided.     See EMR under Patient Instructions for exercises provided during therapy sessions.    ASSESSMENT     Patient unable to perform all exercises this visit due to increased pain. Patient unable to fully extend knee without maximum pain. Patient encouraged to rest knee and ice it as much as she can until next visit due to increased pain from overuse.   Kaity Is progressing well towards her goals.   Pt prognosis is Good.     Pt will continue to benefit from skilled outpatient physical therapy to address the deficits listed in the  problem list box on initial evaluation, provide pt/family education and to maximize pt's level of independence in the home and community environment.     Pt's spiritual, cultural and educational needs considered and pt agreeable to plan of care and goals.    Anticipated barriers to physical therapy:     GOALS:     Short Term Goals:  6 weeks Progress       1. Pain: Pt will demonstrate improved pain by reports of less than or equal to 5/10 worst pain on the verbal rating scale in order to progress toward maximal functional ability and improve QOL. PC   2. Function: Patient will demonstrate improved function as indicated by a functional limitation score of less than or equal to 67 out of 100 on FOTO. PC   3. Mobility: Patient will improve AROM to 50% of stated goals, listed in objective measures above, in order to progress towards independence with functional activities.  PC   4. Strength: Patient will improve strength to 50% of stated goals, listed in objective measures above, in order to progress towards independence with functional activities.  PC   5. HEP: Patient will demonstrate independence with HEP in order to progress toward functional independence. PC      Long Term Goals:  12 weeks Progress      1. Pain: Pt will demonstrate improved pain by reports of less than or equal to 2/10 worst pain on the verbal rating scale in order to progress toward maximal functional ability and improve QOL.   PC   2. Function: Patient will demonstrate improved function as indicated by a functional limitation score of less than or equal to 70 out of 100 on FOTO. PC   3. Mobility: Patient will improve AROM to stated goals, listed in objective measures above, in order to return to maximal functional potential and improve quality of life. PC   4. Strength: Patient will improve strength to stated goals, listed in objective measures above, in order to improve functional independence and quality of life. PC   5. Patient will return to  normal ADL's, IADL's, community involvement, recreational activities, and work-related activities with less than or equal to 2/10 pain and maximal function.  PC    PC= progressing/continue;    PM= partially met;             DC= discontinue         PC = progressing/continue  PM= partially met  DC= discontinue    PLAN     Continue Plan of Care (POC) and progress per patient tolerance. See Treatment section for exercise progression.    Carlton Gusman, PTA, DPT

## 2022-06-09 ENCOUNTER — CLINICAL SUPPORT (OUTPATIENT)
Dept: REHABILITATION | Facility: HOSPITAL | Age: 67
End: 2022-06-09
Payer: COMMERCIAL

## 2022-06-09 DIAGNOSIS — M25.662 DECREASED RANGE OF MOTION OF BOTH KNEES: ICD-10-CM

## 2022-06-09 DIAGNOSIS — M25.562 LEFT ANTERIOR KNEE PAIN: Primary | ICD-10-CM

## 2022-06-09 DIAGNOSIS — M25.661 DECREASED RANGE OF MOTION OF BOTH KNEES: ICD-10-CM

## 2022-06-09 PROCEDURE — 97110 THERAPEUTIC EXERCISES: CPT | Mod: CQ

## 2022-06-09 NOTE — PROGRESS NOTES
Physical Therapy Treatment Note     Name: Cecilia Botello  [facility] Number: [id number]    Therapy Diagnosis: No diagnosis found.  Physician: Wilfred Alas MD    Visit Date: 6/9/2022    Physician Orders: PT Eval and Treat   Medical Diagnosis from Referral: Osteoarthritis of right knee, Unspecified tear of unspecified meniscus, current injury, left knee, initial encounter   Evaluation Date: 5/11/2022  Progress note due: 6/8/2022  Authorization Period Expiration: 4/29/2023  Plan of Care Expiration: 08/03/2022  Visit # / Visits authorized: 7/ 10  PTA Visit: 2    Time In: 1351  Time Out: 1420  Total Billable Time: 29 minutes    Precautions: Standard    Subjective     Pt reports: Patient stated her pain is doing a little better today than last visit.   She was compliant with home exercise program.    Pain: 3/10  Location: left knee      Objective     Kaity received therapeutic exercises to develop strength for 29 minutes including:  Nu step x 6 min  Quad sets 3 x 10  SAQ 3 x 10 with 2 lb weight   SLR 3 x 10  Bridges 3 x 10  Knee flexion with theraball 3 x 10   Chair squats 2 x 10     Home Exercises Provided and Patient Education Provided     Education provided: no new exercises added this visit     Written Home Exercises Provided: Patient instructed to cont prior HEP.  Exercises were reviewed and Kaity was able to demonstrate them prior to the end of the session.  Kaity demonstrated good  understanding of the education provided.     See EMR under Patient Instructions for exercises provided prior visit.    Assessment     Patient had no new complaints following therapy today.   Kaity Is progressing well towards her goals.   Pt prognosis is Good.     Pt will continue to benefit from skilled outpatient physical therapy to address the deficits listed in the problem list box on initial evaluation, provide pt/family education and to maximize pt's level of independence in the home and community environment.     Pt's  spiritual, cultural and educational needs considered and pt agreeable to plan of care and goals.     Anticipated barriers to physical therapy: none    Goals:    Short Term Goals:  6 weeks Progress       1. Pain: Pt will demonstrate improved pain by reports of less than or equal to 5/10 worst pain on the verbal rating scale in order to progress toward maximal functional ability and improve QOL. PC   2. Function: Patient will demonstrate improved function as indicated by a functional limitation score of less than or equal to 67 out of 100 on FOTO. PC   3. Mobility: Patient will improve AROM to 50% of stated goals, listed in objective measures above, in order to progress towards independence with functional activities.  PC   4. Strength: Patient will improve strength to 50% of stated goals, listed in objective measures above, in order to progress towards independence with functional activities.  PC   5. HEP: Patient will demonstrate independence with HEP in order to progress toward functional independence. PC      Long Term Goals:  12 weeks Progress      1. Pain: Pt will demonstrate improved pain by reports of less than or equal to 2/10 worst pain on the verbal rating scale in order to progress toward maximal functional ability and improve QOL.   PC   2. Function: Patient will demonstrate improved function as indicated by a functional limitation score of less than or equal to 70 out of 100 on FOTO. PC   3. Mobility: Patient will improve AROM to stated goals, listed in objective measures above, in order to return to maximal functional potential and improve quality of life. PC   4. Strength: Patient will improve strength to stated goals, listed in objective measures above, in order to improve functional independence and quality of life. PC   5. Patient will return to normal ADL's, IADL's, community involvement, recreational activities, and work-related activities with less than or equal to 2/10 pain and maximal function.   PC     Plan     Continue with appropriate POC    Chelsea Abbasi, RHEA  6/9/2022

## 2025-04-22 ENCOUNTER — HOSPITAL ENCOUNTER (EMERGENCY)
Facility: HOSPITAL | Age: 70
Discharge: LEFT BEFORE DISCHARGE | End: 2025-04-23
Payer: COMMERCIAL

## 2025-04-22 DIAGNOSIS — R42 DIZZINESS: Primary | ICD-10-CM

## 2025-04-22 DIAGNOSIS — R27.8 DECREASED COORDINATION: ICD-10-CM

## 2025-04-22 DIAGNOSIS — R42 LIGHTHEADEDNESS: ICD-10-CM

## 2025-04-22 DIAGNOSIS — N39.0 URINARY TRACT INFECTION WITHOUT HEMATURIA, SITE UNSPECIFIED: ICD-10-CM

## 2025-04-22 DIAGNOSIS — R20.2 FACIAL TINGLING SENSATION: ICD-10-CM

## 2025-04-22 DIAGNOSIS — Z53.29 LEFT AGAINST MEDICAL ADVICE: ICD-10-CM

## 2025-04-22 DIAGNOSIS — R53.1 LEFT-SIDED WEAKNESS: ICD-10-CM

## 2025-04-22 DIAGNOSIS — R00.1 BRADYCARDIA: ICD-10-CM

## 2025-04-22 DIAGNOSIS — R26.9 GAIT ABNORMALITY: ICD-10-CM

## 2025-04-22 DIAGNOSIS — R51.9 ACUTE NONINTRACTABLE HEADACHE, UNSPECIFIED HEADACHE TYPE: ICD-10-CM

## 2025-04-22 LAB
ALBUMIN SERPL BCP-MCNC: 3.7 G/DL (ref 3.4–4.8)
ALBUMIN/GLOB SERPL: 0.9 {RATIO}
ALP SERPL-CCNC: 75 U/L (ref 40–150)
ALT SERPL W P-5'-P-CCNC: 60 U/L
AMPHET UR QL SCN: NEGATIVE
ANION GAP SERPL CALCULATED.3IONS-SCNC: 11 MMOL/L (ref 7–16)
APTT PPP: 32.1 SECONDS (ref 25.2–37.3)
AST SERPL W P-5'-P-CCNC: 56 U/L (ref 11–45)
BACTERIA #/AREA URNS HPF: ABNORMAL /HPF
BARBITURATES UR QL SCN: NEGATIVE
BASOPHILS # BLD AUTO: 0.06 K/UL (ref 0–0.2)
BASOPHILS NFR BLD AUTO: 0.6 % (ref 0–1)
BENZODIAZ METAB UR QL SCN: NEGATIVE
BILIRUB SERPL-MCNC: 0.4 MG/DL
BILIRUB UR QL STRIP: NEGATIVE
BUN SERPL-MCNC: 17 MG/DL (ref 10–20)
BUN/CREAT SERPL: 14 (ref 6–20)
CALCIUM SERPL-MCNC: 9.2 MG/DL (ref 8.4–10.2)
CANNABINOIDS UR QL SCN: NEGATIVE
CHLORIDE SERPL-SCNC: 113 MMOL/L (ref 98–107)
CLARITY UR: ABNORMAL
CO2 SERPL-SCNC: 22 MMOL/L (ref 23–31)
COCAINE UR QL SCN: NEGATIVE
COLOR UR: YELLOW
CREAT SERPL-MCNC: 1.24 MG/DL (ref 0.55–1.02)
DIFFERENTIAL METHOD BLD: ABNORMAL
EGFR (NO RACE VARIABLE) (RUSH/TITUS): 48 ML/MIN/1.73M2
EOSINOPHIL # BLD AUTO: 0.13 K/UL (ref 0–0.5)
EOSINOPHIL NFR BLD AUTO: 1.3 % (ref 1–4)
ERYTHROCYTE [DISTWIDTH] IN BLOOD BY AUTOMATED COUNT: 13.6 % (ref 11.5–14.5)
ETHANOL SERPL-MCNC: <10 MG/DL
GLOBULIN SER-MCNC: 4.2 G/DL (ref 2–4)
GLUCOSE SERPL-MCNC: 111 MG/DL (ref 82–115)
GLUCOSE SERPL-MCNC: 98 MG/DL (ref 70–105)
GLUCOSE UR STRIP-MCNC: NEGATIVE MG/DL
HCT VFR BLD AUTO: 42.1 % (ref 38–47)
HGB BLD-MCNC: 14.2 G/DL (ref 12–16)
IMM GRANULOCYTES # BLD AUTO: 0.03 K/UL (ref 0–0.04)
IMM GRANULOCYTES NFR BLD: 0.3 % (ref 0–0.4)
INR BLD: 1.18
KETONES UR STRIP-SCNC: NEGATIVE MG/DL
LEUKOCYTE ESTERASE UR QL STRIP: ABNORMAL
LYMPHOCYTES # BLD AUTO: 2.16 K/UL (ref 1–4.8)
LYMPHOCYTES NFR BLD AUTO: 21.9 % (ref 27–41)
MAGNESIUM SERPL-MCNC: 2.3 MG/DL (ref 1.6–2.6)
MCH RBC QN AUTO: 29.8 PG (ref 27–31)
MCHC RBC AUTO-ENTMCNC: 33.7 G/DL (ref 32–36)
MCV RBC AUTO: 88.3 FL (ref 80–96)
MONOCYTES # BLD AUTO: 0.35 K/UL (ref 0–0.8)
MONOCYTES NFR BLD AUTO: 3.6 % (ref 2–6)
MPC BLD CALC-MCNC: 11.2 FL (ref 9.4–12.4)
NEUTROPHILS # BLD AUTO: 7.12 K/UL (ref 1.8–7.7)
NEUTROPHILS NFR BLD AUTO: 72.3 % (ref 53–65)
NITRITE UR QL STRIP: NEGATIVE
NRBC # BLD AUTO: 0 X10E3/UL
NRBC, AUTO (.00): 0 %
OPIATES UR QL SCN: NEGATIVE
PCP UR QL SCN: NEGATIVE
PH UR STRIP: 7 PH UNITS
PLATELET # BLD AUTO: 172 K/UL (ref 150–400)
POTASSIUM SERPL-SCNC: 4.1 MMOL/L (ref 3.5–5.1)
PROT SERPL-MCNC: 7.9 G/DL (ref 5.8–7.6)
PROT UR QL STRIP: NEGATIVE
PROTHROMBIN TIME: 15.8 SECONDS (ref 11.7–14.7)
RBC # BLD AUTO: 4.77 M/UL (ref 4.2–5.4)
RBC # UR STRIP: NEGATIVE /UL
RBC #/AREA URNS HPF: ABNORMAL /HPF
SODIUM SERPL-SCNC: 142 MMOL/L (ref 136–145)
SP GR UR STRIP: 1.01
SQUAMOUS #/AREA URNS LPF: ABNORMAL /LPF
TROPONIN I SERPL HS-MCNC: <2.7 NG/L
TROPONIN I SERPL HS-MCNC: <2.7 NG/L
UROBILINOGEN UR STRIP-ACNC: 1 MG/DL
WBC # BLD AUTO: 9.85 K/UL (ref 4.5–11)
WBC #/AREA URNS HPF: ABNORMAL /HPF

## 2025-04-22 PROCEDURE — 83735 ASSAY OF MAGNESIUM: CPT

## 2025-04-22 PROCEDURE — 99285 EMERGENCY DEPT VISIT HI MDM: CPT | Mod: 25

## 2025-04-22 PROCEDURE — 80053 COMPREHEN METABOLIC PANEL: CPT

## 2025-04-22 PROCEDURE — 25500020 PHARM REV CODE 255

## 2025-04-22 PROCEDURE — 82962 GLUCOSE BLOOD TEST: CPT

## 2025-04-22 PROCEDURE — 81003 URINALYSIS AUTO W/O SCOPE: CPT

## 2025-04-22 PROCEDURE — 96374 THER/PROPH/DIAG INJ IV PUSH: CPT | Mod: 59

## 2025-04-22 PROCEDURE — 84484 ASSAY OF TROPONIN QUANT: CPT

## 2025-04-22 PROCEDURE — 27000221 HC OXYGEN, UP TO 24 HOURS

## 2025-04-22 PROCEDURE — 87086 URINE CULTURE/COLONY COUNT: CPT

## 2025-04-22 PROCEDURE — 25000003 PHARM REV CODE 250

## 2025-04-22 PROCEDURE — 85610 PROTHROMBIN TIME: CPT

## 2025-04-22 PROCEDURE — 94761 N-INVAS EAR/PLS OXIMETRY MLT: CPT

## 2025-04-22 PROCEDURE — 82077 ASSAY SPEC XCP UR&BREATH IA: CPT

## 2025-04-22 PROCEDURE — 63600175 PHARM REV CODE 636 W HCPCS

## 2025-04-22 PROCEDURE — 93005 ELECTROCARDIOGRAM TRACING: CPT

## 2025-04-22 PROCEDURE — 96361 HYDRATE IV INFUSION ADD-ON: CPT

## 2025-04-22 PROCEDURE — 80307 DRUG TEST PRSMV CHEM ANLYZR: CPT

## 2025-04-22 PROCEDURE — 85730 THROMBOPLASTIN TIME PARTIAL: CPT

## 2025-04-22 PROCEDURE — 99284 EMERGENCY DEPT VISIT MOD MDM: CPT | Mod: GF | Performed by: NURSE PRACTITIONER

## 2025-04-22 PROCEDURE — G0426 INPT/ED TELECONSULT50: HCPCS | Mod: GT,,, | Performed by: STUDENT IN AN ORGANIZED HEALTH CARE EDUCATION/TRAINING PROGRAM

## 2025-04-22 PROCEDURE — 85025 COMPLETE CBC W/AUTO DIFF WBC: CPT

## 2025-04-22 RX ORDER — CARVEDILOL 25 MG/1
TABLET ORAL
COMMUNITY
Start: 2025-03-31

## 2025-04-22 RX ORDER — ATORVASTATIN CALCIUM 40 MG/1
80 TABLET, FILM COATED ORAL NIGHTLY
Status: DISCONTINUED | OUTPATIENT
Start: 2025-04-22 | End: 2025-04-23 | Stop reason: HOSPADM

## 2025-04-22 RX ORDER — TOPIRAMATE 200 MG/1
200 TABLET, FILM COATED ORAL 2 TIMES DAILY
COMMUNITY

## 2025-04-22 RX ORDER — OMEPRAZOLE 40 MG/1
40 CAPSULE, DELAYED RELEASE ORAL
COMMUNITY
Start: 2024-10-17

## 2025-04-22 RX ORDER — ATORVASTATIN CALCIUM 80 MG/1
80 TABLET, FILM COATED ORAL
COMMUNITY
Start: 2025-04-14

## 2025-04-22 RX ORDER — CLONAZEPAM 1 MG/1
1 TABLET ORAL
COMMUNITY
Start: 2025-02-27

## 2025-04-22 RX ORDER — ACETAMINOPHEN 325 MG/1
650 TABLET ORAL
Status: COMPLETED | OUTPATIENT
Start: 2025-04-22 | End: 2025-04-22

## 2025-04-22 RX ORDER — BUDESONIDE 3 MG/1
9 CAPSULE, COATED PELLETS ORAL
COMMUNITY
Start: 2025-02-06 | End: 2025-04-28

## 2025-04-22 RX ORDER — RIVAROXABAN 20 MG/1
20 TABLET, FILM COATED ORAL
COMMUNITY

## 2025-04-22 RX ORDER — DICYCLOMINE HYDROCHLORIDE 10 MG/1
10 CAPSULE ORAL
COMMUNITY
Start: 2025-01-27 | End: 2025-04-27

## 2025-04-22 RX ORDER — CEFTRIAXONE 1 G/1
1 INJECTION, POWDER, FOR SOLUTION INTRAMUSCULAR; INTRAVENOUS
Status: COMPLETED | OUTPATIENT
Start: 2025-04-22 | End: 2025-04-22

## 2025-04-22 RX ORDER — IOPAMIDOL 755 MG/ML
100 INJECTION, SOLUTION INTRAVASCULAR
Status: COMPLETED | OUTPATIENT
Start: 2025-04-22 | End: 2025-04-22

## 2025-04-22 RX ORDER — PROCHLORPERAZINE EDISYLATE 5 MG/ML
5 INJECTION INTRAMUSCULAR; INTRAVENOUS
Status: COMPLETED | OUTPATIENT
Start: 2025-04-22 | End: 2025-04-22

## 2025-04-22 RX ORDER — FUROSEMIDE 20 MG/1
20 TABLET ORAL
COMMUNITY

## 2025-04-22 RX ORDER — MIRABEGRON 25 MG/1
25 TABLET, FILM COATED, EXTENDED RELEASE ORAL DAILY
COMMUNITY

## 2025-04-22 RX ORDER — SPIRONOLACTONE 25 MG/1
25 TABLET ORAL
COMMUNITY
Start: 2025-03-20

## 2025-04-22 RX ORDER — ONDANSETRON 4 MG/1
4 TABLET, ORALLY DISINTEGRATING ORAL ONCE
COMMUNITY

## 2025-04-22 RX ADMIN — SODIUM CHLORIDE 1000 ML: 9 INJECTION, SOLUTION INTRAVENOUS at 06:04

## 2025-04-22 RX ADMIN — PROCHLORPERAZINE EDISYLATE 5 MG: 5 INJECTION INTRAMUSCULAR; INTRAVENOUS at 05:04

## 2025-04-22 RX ADMIN — IOPAMIDOL 100 ML: 755 INJECTION, SOLUTION INTRAVENOUS at 06:04

## 2025-04-22 RX ADMIN — CEFTRIAXONE SODIUM 1 G: 1 INJECTION, POWDER, FOR SOLUTION INTRAMUSCULAR; INTRAVENOUS at 06:04

## 2025-04-22 RX ADMIN — ACETAMINOPHEN 650 MG: 325 TABLET ORAL at 05:04

## 2025-04-22 RX ADMIN — ATORVASTATIN CALCIUM 80 MG: 40 TABLET, FILM COATED ORAL at 08:04

## 2025-04-22 RX ADMIN — RIVAROXABAN 20 MG: 10 TABLET, FILM COATED ORAL at 08:04

## 2025-04-22 NOTE — LETTER
This communication is flagged as high priority.      Patient: Kaity Botello  YOB: 1955  Date: 4/23/2025 Time: 10:23 AM  Location: [facility]    Leaving the Hospital Against Medical Advice    Chart #:[id number]    This will certify that I, the undersigned,    ______________________________________________________________________    A patient in the above named medical center, having requested discharge and removal from the medical center against the advice of my attending physician(s), hereby release [facility], its physicians, officers and employees, severally and individually, from any and all liability of any nature whatsoever for any injury or harm or complication of any kind that may result directly or indirectly, by reason of my terminating my stay as a patient at [facility] and my departure from [facility], and hereby waive any and all rights of action I may now have or later acquire as a result of my voluntary departure from [facility] and the termination of my stay as a patient therein.    This release is made with the full knowledge of the danger that may result from the action which I am taking.      Date:___04/23/25_______________                         ___________________________                                                                                    Patient/Legal Representative    Witness:        ____________________________                          ___________________________  Nurse                                                                        Physician

## 2025-04-22 NOTE — NURSING
1717: Pt taken to CT  1717: Blood glucose checked  1725: Pt back to exam 1 from CT  1725: 12 lead EKG performed per RT

## 2025-04-22 NOTE — SUBJECTIVE & OBJECTIVE
HPI:  69 y.o. female with a PMHx significant for osteoarthritis, TIA/stroke presenting with dizziness, posterior headache, tingling over the left side and possible worsening of chronic left-sided weakness. LKN 0800 this morning. She is on xarelto 20mg for stroke.     /73  BG 95     Images personally reviewed and interpreted:  Study: Head CT  Study Interpretation: No acute intracranial hemorrhage.      Assessment and plan:  # Dizziness    Lytics recommendation: Thrombolytic therapy not recommended due to Outside of treatment window   Thrombectomy recommendation: Awaiting CTA results from [facility] for determination   Placement recommendation: pending further studies     - Recommend an expedited CTA head/neck to evaluate for potential symptomatic stenosis/occlusive lesion that might significantly increase risk of recurrent or worsening signs/symptoms.   - If no LVO or high-grade stenosis on CTA head/neck, recommend a follow-up non-urgent MRI brain without contrast to evaluate for acute ischemia.  - Continue xarelto 20mg daily at this time.   - Allow for permissive HTN, SBP < 220, until MRI is completed. If MRI is positive for acute ischemia, recommend admission for stroke work-up including TTE, lipid panel, hemoglobin A1c, and PT/OT/SLP evaluations as indicated.   - If above studies are abnormal, please load images to teleneurology imaging system and contact us to review.    Please contact us with any further questions or concerns or if patient has any acute neurological changes (new symptoms, worsening deficits).

## 2025-04-22 NOTE — ED PROVIDER NOTES
"Encounter Date: 4/22/2025       History     Chief Complaint   Patient presents with    Fatigue    Dizziness     69-year-old female presents to the ED with complaints of dizziness, lightheadedness, left-sided facial tingling, and headache that began at 8:00 a.m. this morning.  She also has left-sided weakness from prior TIA/CVA but denies any significant alteration during today's episode.  She does report that she has felt her gait is more abnormal today and decreased coordination.  Denies any injury or trauma.  She also has a known history of vertigo but reports that today's episode is different compared to her usual vertigo type symptoms.  She is not able to describe the difference but reports a different sensation "in her head." History of 2 prior TIAs and most recently CVA in 2019/20.  Currently on Xarelto and reports compliance (last dose last night per usual routine) but is also supposed to be taking ASA 81 mg daily and states she has not had this medication approximately a year.  Vital signs stable.  Current NIH for with accompanying posterior headache.    The history is provided by the patient.     Review of patient's allergies indicates:  No Known Allergies  History reviewed. No pertinent past medical history.  History reviewed. No pertinent surgical history.  No family history on file.  Social History[1]  Review of Systems   Constitutional:  Negative for activity change, appetite change, chills and fever.   HENT:  Negative for congestion and sore throat.    Eyes: Negative.    Respiratory:  Negative for cough and shortness of breath.    Cardiovascular:  Negative for chest pain and palpitations.   Gastrointestinal:  Negative for abdominal pain, diarrhea, nausea and vomiting.   Endocrine: Negative.    Genitourinary:  Negative for dysuria and frequency.   Musculoskeletal:  Negative for back pain, neck pain and neck stiffness.   Skin:  Negative for color change, pallor and rash.   Allergic/Immunologic: Negative. "    Neurological:  Positive for dizziness, weakness (unknown acute vs chronic), light-headedness and headaches. Negative for syncope and speech difficulty.   Hematological:  Does not bruise/bleed easily.   Psychiatric/Behavioral:  Negative for agitation and confusion. The patient is nervous/anxious. The patient is not hyperactive.    All other systems reviewed and are negative.      Physical Exam     Initial Vitals   BP Pulse Resp Temp SpO2   04/22/25 1726 04/22/25 1730 04/22/25 1730 04/22/25 1730 04/22/25 1730   118/79 61 19 97.9 °F (36.6 °C) (!) 94 %      MAP       --                Physical Exam    Nursing note and vitals reviewed.  Constitutional: She appears well-developed and well-nourished. She is not diaphoretic. She is Obese . She is active and cooperative.  Non-toxic appearance. She appears distressed (acutely anxious).   HENT:   Head: Normocephalic and atraumatic.       Right Ear: External ear normal.   Left Ear: External ear normal.   Nose: Nose normal. Mouth/Throat: Oropharynx is clear and moist.   Eyes: Conjunctivae and EOM are normal. Pupils are equal, round, and reactive to light.   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   Neck: Neck supple.   Normal range of motion.  Cardiovascular:  Normal rate, regular rhythm and normal heart sounds.           Pulmonary/Chest: Breath sounds normal. No respiratory distress. She has no wheezes. She has no rhonchi. She has no rales. She exhibits no tenderness.   Abdominal: Abdomen is soft. Bowel sounds are normal. She exhibits no distension. There is no abdominal tenderness. There is no rebound and no guarding.   Musculoskeletal:         General: Normal range of motion.      Cervical back: Normal range of motion and neck supple.     Neurological: She is alert and oriented to person, place, and time. Coordination and gait abnormal. GCS score is 15. GCS eye subscore is 4. GCS verbal subscore is 5. GCS motor subscore is 6.   Mild weakness on left  upper and lower extremity. Mild left facial droop. Unable to compare to normal baseline reported weakness from prior CVA. See NIH for further documentation   Skin: Skin is warm and dry. Capillary refill takes less than 2 seconds.   Psychiatric: She has a normal mood and affect. Her behavior is normal. Judgment and thought content normal.         Medical Screening Exam   See Full Note    ED Course   Procedures  Labs Reviewed   COMPREHENSIVE METABOLIC PANEL - Abnormal       Result Value    Sodium 142      Potassium 4.1      Chloride 113 (*)     CO2 22 (*)     Anion Gap 11      Glucose 111      BUN 17      Creatinine 1.24 (*)     BUN/Creatinine Ratio 14      Calcium 9.2      Total Protein 7.9 (*)     Albumin 3.7      Globulin 4.2 (*)     A/G Ratio 0.9      Bilirubin, Total 0.4      Alk Phos 75      ALT 60 (*)     AST 56 (*)     eGFR 48 (*)    PROTIME-INR - Abnormal    PT 15.8 (*)     INR 1.18     URINALYSIS, REFLEX TO URINE CULTURE - Abnormal    Color, UA Yellow      Clarity, UA Cloudy      pH, UA 7.0      Leukocytes, UA Small (*)     Nitrites, UA Negative      Protein, UA Negative      Glucose, UA Negative      Ketones, UA Negative      Urobilinogen, UA 1.0      Bilirubin, UA Negative      Blood, UA Negative      Specific Gravity, UA 1.015     CBC WITH DIFFERENTIAL - Abnormal    WBC 9.85      RBC 4.77      Hemoglobin 14.2      Hematocrit 42.1      MCV 88.3      MCH 29.8      MCHC 33.7      RDW 13.6      Platelet Count 172      MPV 11.2      Neutrophils % 72.3 (*)     Lymphocytes % 21.9 (*)     Monocytes % 3.6      Eosinophils % 1.3      Basophils % 0.6      Immature Granulocytes % 0.3      nRBC, Auto 0.0      Neutrophils, Abs 7.12      Lymphocytes, Absolute 2.16      Monocytes, Absolute 0.35      Eosinophils, Absolute 0.13      Basophils, Absolute 0.06      Immature Granulocytes, Absolute 0.03      nRBC, Absolute 0.00      Diff Type Auto     URINALYSIS, MICROSCOPIC - Abnormal    WBC, UA 5-10 (*)     RBC, UA 0-3       Bacteria, UA Few (*)     Squamous Epithelial Cells, UA Few (*)    APTT - Normal    PTT 32.1     MAGNESIUM - Normal    Magnesium 2.3     TROPONIN I - Normal    Troponin I High Sensitivity <2.7     DRUG SCREEN, URINE (BEAKER) - Normal    Barbiturates, Urine Negative      Benzodiazepine, Urine Negative      Opiates, Urine Negative      Phencyclidine, Urine Negative      Amphetamine, Urine Negative      Cannabinoid, Urine Negative      Cocaine, Urine Negative      Narrative:     This screen includes the following classes of drugs at the listed cut-off:    Benzodiazepines 200 ng/ml  Cocaine metabolite 300 ng/ml  Opiates 2000 ng/ml  Barbiturates 200 ng/ml  Amphetamines 500 ng/ml  Marijuana metabs (THC) 50 ng/ml  Phencyclidine (PCP) 25 ng/ml    This is a screening test. If results do not correlate with clinical presentation, then a confirmatory send out test is advised.   ALCOHOL,MEDICAL (ETHANOL) - Normal    Ethanol <10     TROPONIN I - Normal    Troponin I High Sensitivity <2.7     CULTURE, URINE   CBC W/ AUTO DIFFERENTIAL    Narrative:     The following orders were created for panel order CBC auto differential.  Procedure                               Abnormality         Status                     ---------                               -----------         ------                     CBC with Differential[[id number]]       Abnormal            Final result                 Please view results for these tests on the individual orders.   POCT GLUCOSE MONITORING CONTINUOUS    POC Glucose 98     POCT GLUCOSE MONITORING CONTINUOUS          Imaging Results              CTA Neck (Final result)  Result time 04/22/25 19:15:31      Final result by Vic Hearn MD (04/22/25 19:15:31)                   Impression:      No large vessel occlusion in the intracranial circulation.    No hemodynamically significant stenosis in the neck vessels.    Consideration for MRI of the brain, as clinically warranted.    Soft tissue thickening  within the oropharyngeal soft tissues.  Outpatient follow-up repeat CT scan of the neck may be obtained further evaluation.      Electronically signed by: Vic Hearn MD  Date:    04/22/2025  Time:    19:15               Narrative:    EXAMINATION:  CTA HEAD; CTA NECK    CLINICAL HISTORY:  Stroke, follow up;Dizziness, non-specific;; Stroke, follow up;    TECHNIQUE:  CT angiogram was performed from the level of the bottom of C2 to the top of the head following the IV administration of 100mL of Isovue-370 .   Sagittal and coronal reconstructions and maximum intensity projection reconstructions were performed.    COMPARISON:  CT head dated 04/22/2025    FINDINGS:  CTA neck:    There are minimal calcifications in the aortic arch.  The great vessels arising from the aortic arch are within normal limits.  The visualized subclavian arteries are within normal limits.    The right common carotid is within normal limits with minimal calcifications.  There also minimal calcifications involving the right cervical ICA.    There are minimal calcifications involving the proximal aspect of the left cervical ICA.  Left cervical ICA is otherwise unremarkable.    There is poor delineation of the origins of the right vertebral artery.  The origin the left vertebral artery is unremarkable.    There is a dominant left vertebral artery.    There is no evidence of hemodynamically significant stenosis in the neck vessels.    CTA head:    The intracranial segments of the ICA are within normal limits.  The anterior cerebral arteries and anterior communicating artery complex are within normal limits.  The middle cerebral arteries are within normal limits.    There is a dominant left vertebral.  The basilar is within normal limits.  There are robust bilateral posterior communicating artery supplying the PCAs.  The PCAs are normal in caliber.    The dural venous sinuses are within normal limits.    There is no abnormal intracranial  enhancement.    Additional findings:    There is mucosal thickening within the ethmoid and maxillary sinuses.  The mastoid air cells are clear.  There is mild soft tissue thickening involving the oropharyngeal regions.  No evidence of lymphadenopathy.    The trachea is unremarkable.  The visualized lung apices are within normal limits.  There are degenerative changes in the cervical spine.  There is no evidence of a fracture.                                       CTA Brain (Final result)  Result time 04/22/25 19:15:31      Final result by Vic Hearn MD (04/22/25 19:15:31)                   Impression:      No large vessel occlusion in the intracranial circulation.    No hemodynamically significant stenosis in the neck vessels.    Consideration for MRI of the brain, as clinically warranted.    Soft tissue thickening within the oropharyngeal soft tissues.  Outpatient follow-up repeat CT scan of the neck may be obtained further evaluation.      Electronically signed by: Vic Hearn MD  Date:    04/22/2025  Time:    19:15               Narrative:    EXAMINATION:  CTA HEAD; CTA NECK    CLINICAL HISTORY:  Stroke, follow up;Dizziness, non-specific;; Stroke, follow up;    TECHNIQUE:  CT angiogram was performed from the level of the bottom of C2 to the top of the head following the IV administration of 100mL of Isovue-370 .   Sagittal and coronal reconstructions and maximum intensity projection reconstructions were performed.    COMPARISON:  CT head dated 04/22/2025    FINDINGS:  CTA neck:    There are minimal calcifications in the aortic arch.  The great vessels arising from the aortic arch are within normal limits.  The visualized subclavian arteries are within normal limits.    The right common carotid is within normal limits with minimal calcifications.  There also minimal calcifications involving the right cervical ICA.    There are minimal calcifications involving the proximal aspect of the left cervical ICA.  Left  cervical ICA is otherwise unremarkable.    There is poor delineation of the origins of the right vertebral artery.  The origin the left vertebral artery is unremarkable.    There is a dominant left vertebral artery.    There is no evidence of hemodynamically significant stenosis in the neck vessels.    CTA head:    The intracranial segments of the ICA are within normal limits.  The anterior cerebral arteries and anterior communicating artery complex are within normal limits.  The middle cerebral arteries are within normal limits.    There is a dominant left vertebral.  The basilar is within normal limits.  There are robust bilateral posterior communicating artery supplying the PCAs.  The PCAs are normal in caliber.    The dural venous sinuses are within normal limits.    There is no abnormal intracranial enhancement.    Additional findings:    There is mucosal thickening within the ethmoid and maxillary sinuses.  The mastoid air cells are clear.  There is mild soft tissue thickening involving the oropharyngeal regions.  No evidence of lymphadenopathy.    The trachea is unremarkable.  The visualized lung apices are within normal limits.  There are degenerative changes in the cervical spine.  There is no evidence of a fracture.                                       CT HEAD FOR CODE STROKE (Final result)  Result time 04/22/25 17:57:49      Final result by Vic Hearn MD (04/22/25 17:57:49)                   Impression:      No CT findings of large territorial infarction or intracranial hemorrhage.  Additional evaluation MRI of the brain, as clinically warranted.    Changes of chronic vessel ischemic disease.    Right maxillary sinus disease.      Electronically signed by: Vic Hearn MD  Date:    04/22/2025  Time:    17:57               Narrative:    EXAMINATION:  CT HEAD FOR CODE STROKE    CLINICAL HISTORY:  Neuro deficit, acute, stroke suspected;    TECHNIQUE:  Low dose axial images were obtained through the head.   Coronal and sagittal reformations were also performed. Contrast was not administered.    COMPARISON:  MRI dated 07/12/2018.    FINDINGS:  The subcutaneous tissues unremarkable.  There is mucosal thickening within the right maxillary sinus.  The remainder of the paranasal sinuses are unremarkable.  The orbits and intraorbital contents are within normal limits.    There is a prominent retrocerebellar CSF space.  The craniocervical junction otherwise unremarkable.  The sellar and parasellar structures are within normal limits.  There are no extra-axial fluid collections.  There is no evidence of intracranial hemorrhage.    The ventricles and sulci are prominent, consistent cerebral volume loss.  There are scattered hypodensities within ventricular and subcortical white matter.  The gray-white differentiation is maintained.  There are calcifications of the skull base vessels.  There is no dense vessel sign.                                       Medications   atorvastatin tablet 80 mg (80 mg Oral Given 4/22/25 2010)   rivaroxaban tablet 20 mg (20 mg Oral Given 4/22/25 2010)   acetaminophen tablet 650 mg (650 mg Oral Given 4/22/25 1751)   prochlorperazine injection Soln 5 mg (5 mg Intravenous Given 4/22/25 1752)   sodium chloride 0.9% bolus 1,000 mL 1,000 mL (0 mLs Intravenous Stopped 4/22/25 1900)   cefTRIAXone injection 1 g (1 g Intravenous Given 4/22/25 1836)   iopamidoL (ISOVUE-370) injection 100 mL (100 mLs Intravenous Given 4/22/25 1854)     Medical Decision Making  Presents for dizziness, lightheadedness, left-sided facial tingling, headache, abnormal gait, and decreased coordination that began around 8:00 a.m. this morning.  Activated as a code stroke given her symptoms and history.  NIH 4 with accompanying posterior headache.  Tele neurology consult performed with Dr. Eufemia Stewart.  She does confirm that the patient is out of the window for thrombolytics, CT head unremarkable, and recommended CTA head and neck with  "MRI to follow.  Will need transfer for MRI.  Recommends obtaining CTA for additional evaluation prior to transfer. We did discuss additional treatments and she recommended that she does not typically provide aspirin in this case especially considering the patient is currently compliant with her Xarelto.  Additional recommendations provided by her at this time for headache and we will supply Tylenol 650 mg p.o. as well as Compazine 5 mg IV.  Also suggested possible muscle relaxer if no improvement after the initial treatment.  Patient is also noted to be intermittently bradycardic from the high 40s to the low 60s.  GFR 48.  We will continue with CTA head and neck and follow up with normal saline 1 L bolus. Urinalysis shows small leukocytes so we will cover with Rocephin in the interim in case it could be a contributing factor. Verbal results from Dr. Stewart no LVO on CTA. Recommends transfer for further workup. PFC order placed.     Amount and/or Complexity of Data Reviewed  Independent Historian:      Details: 69-year-old female presents to the ED with complaints of dizziness, lightheadedness, left-sided facial tingling, and headache that began at 8:00 a.m. this morning.  She also has left-sided weakness from prior TIA/CVA but denies any significant alteration during today's episode.  She does report that she has felt her gait is more abnormal today and decreased coordination.  Denies any injury or trauma.  She also has a known history of vertigo but reports that today's episode is different compared to her usual vertigo type symptoms.  She is not able to describe the difference but reports a different sensation "in her head." History of 2 prior TIAs and most recently CVA in 2019/20.  Currently on Xarelto and reports compliance (last dose last night per usual routine) but is also supposed to be taking ASA 81 mg daily and states she has not had this medication approximately a year.  Vital signs stable.  Current NIH for " with accompanying posterior headache.  External Data Reviewed: notes.     Details: Prior CVA admission notes from 2020 at [facility] in [place], [place] reviewed in detail. Mentions prior TIA's in 2005 and 2007.  Labs: ordered. Decision-making details documented in ED Course.     Details: CBC shows a WBC of 9.85, hemoglobin of 14.2, hematocrit of 42.1, platelet count of 172.  Point of care glucose 98.  Magnesium 2.3.  Initial troponin negative at less than 2.7.  ETOH negative at less than 10.  CMP shows a chloride of 113, CO2 of 22, creatinine of 1.24, total protein of 7.9, globulin of 4.2, ALT of 60, AST of 56, and a GFR of 48 but otherwise unremarkable. PT 15.8, INR 1.18, and PTT 32.1. Urinalysis shows small leukocytes but otherwise unremarkable. Microscopic urinalysis shows 5-10 wbc and few bacteria. UDS negative.  Radiology: ordered.     Details: CT head for code stroke: No CT findings of large territorial infarction or intracranial hemorrhage.  Additional evaluation MRI of the brain, as clinically warranted. Changes of chronic vessel ischemic disease. Right maxillary sinus disease.    CTA Head/Neck: No large vessel occlusion in the intracranial circulation. No hemodynamically significant stenosis in the neck vessels. Consideration for MRI of the brain, as clinically warranted. Soft tissue thickening within the oropharyngeal soft tissues.  Outpatient follow-up repeat CT scan of the neck may be obtained further evaluation.  ECG/medicine tests: ordered.     Details: Sinus bradycardia at 52 BPM.   Discussion of management or test interpretation with external provider(s): Tele stroke consult performed with Dr. Eufemia Stewart.  Her recommendations are above and you may also see separate tele neurology note for additional documentation.    Risk  OTC drugs.  Prescription drug management.  Risk Details: Patient is leaving AMA.  GCS 15, awake, alert and oriented x3. Family at bedside.       Additional MDM:     NIH Stroke Scale:    Interval = baseline (upon arrival/admit)  Level of consciousness = 0 - alert  LOC questions = 0 - answers both correctly  LOC commands = 0 - performs both correctly  Best gaze = 0 - normal  Visual = 0 - no visual loss (Known chronic right eye blindness)  Facial palsy = 1 - minor (Unsure of chronicity)  Motor left arm =  1 - drift  Motor right arm =  0 - no drift  Motor left leg = 1 - drift  Motor right leg =  0 - no drift  Limb ataxia = 0 - absent  Sensory = 1 - mild to moderate loss  Best language = 0 - no aphasia  Dysarthria = 0 - normal articulation  Extinction and inattention = 0 - no neglect  NIH Stroke Scale Total = 4              ED Course as of 04/23/25 1024   Tue Apr 22, 2025 1912 Reports improvement in headache after Compazine, Tylenol, and IVF. Denies pain at this time. No change in other symptoms at this time.  [MC]   2009 Patient inquired about her home meds. Remains intermittently bradycardic so we will hold BB at current along with her other non-emergent meds but provide her usual Lipitor and Xarelto per usual schedule for continuity. [MC]   2059 Troponin I High Sensitivity: <2.7 [MC]   2327 Sats noted to be 91. Patient re-evaluated and noted to be asleep. Awakened and denies complaints. Sats improved to 97%. Reports she has c-pap but declining C-pap here tonight. Will provide O2@2L per NC.  [MC]   Wed Apr 23, 2025   1003 There are currently 17 holds in the ER at [facility].  Unsure if patient will get a bed assigned today.  I have reached out to [facility] to see if they will search elsewhere for a bed.   [AG]   1020 Patient called me into the room.  She is not wanting to be transferred and doesn't want a MRI.  I explained to the patient the risks involved with not getting neurology evaluation which could include massive stroke, death, etc.  However, she is not wanting to stay any longer.  She is requesting to sign out AMA.  She will see her PCP on Tuesday. I will send her in Macrobid for her UTI.  She says  she is back to normal for her.  [AG]      ED Course User Index  [AG] Shannon Islas FNP  [MC] Herbert Aguilera FNP                           Clinical Impression:   Final diagnoses:  [R42] Dizziness (Primary)  [R51.9] Acute nonintractable headache, unspecified headache type  [R53.1] Left-sided weakness  [R20.2] Facial tingling sensation  [R42] Lightheadedness  [R27.8] Decreased coordination  [R26.9] Gait abnormality  [N39.0] Urinary tract infection without hematuria, site unspecified  [R00.1] Bradycardia  [Z53.29] Left against medical advice        ED Disposition Condition    AMA                   [1]         Shannon Islas FNP  04/23/25 1024

## 2025-04-22 NOTE — ED TRIAGE NOTES
Pt presents to the ER per self driven POV stating she feels like she is having another stroke. Reports dizziness and HA. PMHx: TIAs, CVA, blindness in right eye since 2007. Takes xarelto at night, last dose last night. Denies taking asa x1 year.

## 2025-04-22 NOTE — TELEMEDICINE CONSULT
[facility]  Vascular Neurology  Comprehensive Stroke Center  TeleVascular Neurology Acute Consultation Note        Consult Information  Consult to Telemedicine - Acute Stroke  Consult performed by: Eufemia Stewart MD  Consult ordered by: Erik Aguilera FNP          Consulting Provider: ERIK AGUILERA   Current Providers  No providers found    Patient Location:  [facility] EMERGENCY DEPART* Emergency Department    Spoke hospital nurse at bedside with patient assisting consultant.  Patient information was obtained from patient.       Stroke Documentation  Acute Stroke Times   Last Known Normal Date: 04/22/25  Last Known Normal Time: 0800  Symptom Onset Date: 04/22/25  Symptom Onset Time: 0800  Stroke Team Called Date: 04/22/25  Stroke Team Called Time: 1730  Stroke Team Arrival Date: 04/22/25  Stroke Team Arrival Time: 1730  Thrombolytic Therapy Recommended: No    NIH Scale:  Interval: baseline  1a. Level of Consciousness: 0-->Alert, keenly responsive  1b. LOC Questions: 0-->Answers both questions correctly  1c. LOC Commands: 0-->Performs both tasks correctly  2. Best Gaze: 0-->Normal  3. Visual: 0-->No visual loss (Blind in right eye)  4. Facial Palsy: 1-->Minor paralysis (flattened nasolabial fold, asymmetry on smiling)  5a. Motor Arm, Left: 1-->Drift, limb holds 90 (or 45) degrees, but drifts down before full 10 seconds, does not hit bed or other support  5b. Motor Arm, Right: 0-->No drift, limb holds 90 (or 45) degrees for full 10 secs  6a. Motor Leg, Left: 1-->Drift, leg falls by the end of the 5-sec period but does not hit bed  6b. Motor Leg, Right: 0-->No drift, leg holds 30 degree position for full 5 secs  7. Limb Ataxia: 0-->Absent  8. Sensory: 1-->Mild-to-moderate sensory loss, patient feels pinprick is less sharp or is dull on the affected side, or there is a loss of superficial pain with pinprick, but patient is aware of being touched (Left-sided face tingling)  9. Best  Language: 0-->No aphasia, normal  10. Dysarthria: 0-->Normal  11. Extinction and Inattention (formerly Neglect): 0-->No abnormality  Total (NIH Stroke Scale): 4      Modified [place]: Score: 0  Karen Coma Scale:     ABCD2 Score:    [id number] Score:    HAS -BLED Score:    ICH Score:    Hunt & Julian Classification:      There were no vitals taken for this visit.      In my opinion, this was a: Tier 1; VAN Stroke Assessment: Negative     Medical Decision Making  HPI:  69 y.o. female with a PMHx significant for osteoarthritis, TIA/stroke presenting with dizziness, posterior headache, tingling over the left side and possible worsening of chronic left-sided weakness. LKN 0800 this morning. She is on xarelto 20mg for stroke.     /73  BG 95     Images personally reviewed and interpreted:  Study: Head CT  Study Interpretation: No acute intracranial hemorrhage.      Assessment and plan:  # Dizziness    Lytics recommendation: Thrombolytic therapy not recommended due to Outside of treatment window   Thrombectomy recommendation: Awaiting CTA results from [facility] for determination   Placement recommendation: pending further studies     - Recommend an expedited CTA head/neck to evaluate for potential symptomatic stenosis/occlusive lesion that might significantly increase risk of recurrent or worsening signs/symptoms.   - If no LVO or high-grade stenosis on CTA head/neck, recommend a follow-up non-urgent MRI brain without contrast to evaluate for acute ischemia.  - Continue xarelto 20mg daily at this time.   - Allow for permissive HTN, SBP < 220, until MRI is completed. If MRI is positive for acute ischemia, recommend admission for stroke work-up including TTE, lipid panel, hemoglobin A1c, and PT/OT/SLP evaluations as indicated.   - If above studies are abnormal, please load images to [employer] imaging system and contact us to review.    Please contact us with any further questions or concerns or if patient has any acute  neurological changes (new symptoms, worsening deficits).           ROS  Physical Exam  No past medical history on file.  No past surgical history on file.  No family history on file.    Diagnoses  Problem Noted   Dizziness 4/22/2025       Eufemia Stewart MD      Emergent/Acute neurological consultation requested by spoke provider due to critical concerns for possible cerebrovascular event that could result in permanent loss of neurologic/bodily function, severe disability or death of this patient.  Immediate/timely evaluation by a highly prepared expert is paramount for optimal outcomes  High risk for neurological deterioration if not properly diagnosed  High risk for neurological deterioration if not treated promplty/as soon as possible  Complex diagnostic evaluation may be required (advanced imaging)  High risk treatment options (thrombolytics and/or thrombectomy)    Patient care was coordinated with spoke provider, including but not limted to    Discussing likely diagnosis/etiology of symptoms  Making recommendations for further diagnostic studies  Making recommendations for intravenous thrombolytics or other advanced therapies  Making recommendations for disposition (admission/transfer for higher level of care)      Neurology consultation requested by spoke provider. Audiovisual encounter with the patient performed using a secure connection.  Results and impressions from the visit are documented on this note and were communicated to the consulting provider/team via direct communication. The note has been shared for addition to the patients electronic medical record.

## 2025-04-23 VITALS
DIASTOLIC BLOOD PRESSURE: 108 MMHG | RESPIRATION RATE: 18 BRPM | OXYGEN SATURATION: 98 % | WEIGHT: 210.19 LBS | BODY MASS INDEX: 32.99 KG/M2 | TEMPERATURE: 98 F | HEART RATE: 52 BPM | SYSTOLIC BLOOD PRESSURE: 134 MMHG | HEIGHT: 67 IN

## 2025-04-23 PROCEDURE — 27000221 HC OXYGEN, UP TO 24 HOURS

## 2025-04-23 RX ORDER — NITROFURANTOIN 25; 75 MG/1; MG/1
100 CAPSULE ORAL 2 TIMES DAILY
Qty: 14 CAPSULE | Refills: 0 | Status: SHIPPED | OUTPATIENT
Start: 2025-04-23 | End: 2025-04-30

## 2025-04-23 NOTE — ED NOTES
Patient resting in bed in no distress, has been to restroom and back without complications   Statement Selected

## 2025-04-23 NOTE — DISCHARGE INSTRUCTIONS
Take medication as prescribed.  If you experience ANY concerning symptoms (dizziness, weakness, numbness, tingling) please go to the nearest ER.  Call your family doctor to schedule close follow-up.

## 2025-04-25 LAB — UA COMPLETE W REFLEX CULTURE PNL UR: NO GROWTH
